# Patient Record
Sex: FEMALE | Race: WHITE | NOT HISPANIC OR LATINO | URBAN - METROPOLITAN AREA
[De-identification: names, ages, dates, MRNs, and addresses within clinical notes are randomized per-mention and may not be internally consistent; named-entity substitution may affect disease eponyms.]

---

## 2017-10-26 ENCOUNTER — EMERGENCY (EMERGENCY)
Facility: HOSPITAL | Age: 27
LOS: 1 days | Discharge: HOME | End: 2017-10-26
Attending: EMERGENCY MEDICINE | Admitting: EMERGENCY MEDICINE
Payer: COMMERCIAL

## 2017-10-26 VITALS
HEART RATE: 72 BPM | SYSTOLIC BLOOD PRESSURE: 116 MMHG | RESPIRATION RATE: 20 BRPM | TEMPERATURE: 99 F | OXYGEN SATURATION: 100 % | WEIGHT: 115.08 LBS | DIASTOLIC BLOOD PRESSURE: 81 MMHG

## 2017-10-26 VITALS
RESPIRATION RATE: 16 BRPM | SYSTOLIC BLOOD PRESSURE: 133 MMHG | OXYGEN SATURATION: 99 % | DIASTOLIC BLOOD PRESSURE: 72 MMHG | TEMPERATURE: 99 F

## 2017-10-26 DIAGNOSIS — K59.00 CONSTIPATION, UNSPECIFIED: ICD-10-CM

## 2017-10-26 DIAGNOSIS — R10.84 GENERALIZED ABDOMINAL PAIN: ICD-10-CM

## 2017-10-26 DIAGNOSIS — N39.0 URINARY TRACT INFECTION, SITE NOT SPECIFIED: ICD-10-CM

## 2017-10-26 PROCEDURE — 99284 EMERGENCY DEPT VISIT MOD MDM: CPT | Mod: 25

## 2017-10-26 PROCEDURE — 74010: CPT | Mod: 26

## 2017-10-26 PROCEDURE — 74020: CPT | Mod: 26

## 2017-10-26 RX ORDER — MULTIVIT WITH MIN/MFOLATE/K2 340-15/3 G
148 POWDER (GRAM) ORAL ONCE
Qty: 0 | Refills: 0 | Status: COMPLETED | OUTPATIENT
Start: 2017-10-26 | End: 2017-10-26

## 2017-10-26 RX ORDER — DOCUSATE SODIUM 100 MG
1 CAPSULE ORAL
Qty: 30 | Refills: 0 | OUTPATIENT
Start: 2017-10-26 | End: 2017-11-10

## 2017-10-26 RX ORDER — CEFUROXIME AXETIL 250 MG
1 TABLET ORAL
Qty: 14 | Refills: 0 | OUTPATIENT
Start: 2017-10-26 | End: 2017-11-02

## 2017-10-26 RX ADMIN — Medication 148 MILLILITER(S): at 17:14

## 2017-10-26 RX ADMIN — Medication 1 ENEMA: at 18:43

## 2017-10-26 NOTE — ED PROVIDER NOTE - CARE PLAN
Principal Discharge DX:	Constipation, unspecified constipation type  Secondary Diagnosis:	Urinary tract infection without hematuria, site unspecified

## 2017-10-26 NOTE — ED PROVIDER NOTE - MEDICAL DECISION MAKING DETAILS
Constipation x 3 days.  No abdominal distention, no vomiting.  Passing minimal gas.  Denies abdominal surgery.  Seen by GI doctor in past for "IBS like" sx.  Well appearing.  No abdominal distention or tenderness.  urine, abdominal films, medications ordered.

## 2017-10-26 NOTE — ED PROVIDER NOTE - DIAGNOSTIC INTERPRETATION
Abdominal x-ray, 2 views - nonobstructive bowel gas pattern Abdominal x-ray, 2 views - nonobstructive bowel gas pattern/no free air

## 2017-10-26 NOTE — ED PROVIDER NOTE - PROGRESS NOTE DETAILS
patient wanting to go home.  Did not have BM but feels better.  Did not want to use enema now.  Non obstructive bowel gas pattern.  Urine with evidence of infection.  + frequency and R flank pain, no CVAT.  Will cover with ceftin.  OTC colace.  refraining from fiber like products.  Follows with Dr. Henderson

## 2017-10-26 NOTE — SBIRT NOTE. - NSSBIRTSERVICES_GEN_A_ED_FT
Provided SBIRT services : Full screen positive. Positive reinforcement provided given patient currently within healthy guidelines. Educational materials reviewed and given to patient .     Audit Score :6    Dast Score :0    Duration : 5 Minutes

## 2017-10-26 NOTE — ED PROVIDER NOTE - OBJECTIVE STATEMENT
26 y/o F with no significant pmhx sent in from urgent care for c/o constipation. Pt reports she was seeing a GI specialist earlier this year for frequent BMs and constipation is very unusual for her. States she was constipated for the past 3 days and today was able to pass only a small amount of soft stool. No BRBPR. Pt c/o associated abdominal cramping and lower back pain today. Pt took 2 doses of Xifaxan on Sunday and Monday. Denies fever, chills, vomiting, urinary symptoms.

## 2022-07-13 NOTE — ED PROVIDER NOTE - ATTESTATION, MLM
I have reviewed and confirmed nurses' notes for patient's medications, allergies, medical history, and surgical history. on file

## 2024-09-20 NOTE — ED ADULT NURSE NOTE - NS ED NURSE DISCH DISPOSITION
FAMILY HISTORY:  Father  Still living? No  FH: pancreatic cancer, Age at diagnosis: Age Unknown     Discharged